# Patient Record
Sex: MALE | Race: AMERICAN INDIAN OR ALASKA NATIVE | URBAN - NONMETROPOLITAN AREA
[De-identification: names, ages, dates, MRNs, and addresses within clinical notes are randomized per-mention and may not be internally consistent; named-entity substitution may affect disease eponyms.]

---

## 2017-11-03 ENCOUNTER — HOSPITAL ENCOUNTER (EMERGENCY)
Facility: HOSPITAL | Age: 57
Discharge: HOME | End: 2017-11-03
Attending: NURSE PRACTITIONER | Admitting: NURSE PRACTITIONER
Payer: COMMERCIAL

## 2017-11-03 ENCOUNTER — APPOINTMENT (OUTPATIENT)
Dept: ULTRASOUND IMAGING | Facility: HOSPITAL | Age: 57
End: 2017-11-03
Attending: NURSE PRACTITIONER
Payer: COMMERCIAL

## 2017-11-03 VITALS
SYSTOLIC BLOOD PRESSURE: 114 MMHG | TEMPERATURE: 98.4 F | DIASTOLIC BLOOD PRESSURE: 64 MMHG | HEIGHT: 69 IN | HEART RATE: 53 BPM | BODY MASS INDEX: 26.66 KG/M2 | RESPIRATION RATE: 16 BRPM | OXYGEN SATURATION: 99 % | WEIGHT: 180 LBS

## 2017-11-03 DIAGNOSIS — M79.89 RIGHT LEG SWELLING: ICD-10-CM

## 2017-11-03 DIAGNOSIS — M79.10 MUSCLE PAIN: ICD-10-CM

## 2017-11-03 LAB
BASOPHILS # BLD AUTO: 0.1 10E9/L (ref 0–0.2)
BASOPHILS NFR BLD AUTO: 0.8 %
CRP SERPL-MCNC: <2.9 MG/L (ref 0–8)
DIFFERENTIAL METHOD BLD: ABNORMAL
EOSINOPHIL # BLD AUTO: 0.5 10E9/L (ref 0–0.7)
EOSINOPHIL NFR BLD AUTO: 4 %
ERYTHROCYTE [DISTWIDTH] IN BLOOD BY AUTOMATED COUNT: 13.7 % (ref 10–15)
HCT VFR BLD AUTO: 42.6 % (ref 40–53)
HGB BLD-MCNC: 14.3 G/DL (ref 13.3–17.7)
IMM GRANULOCYTES # BLD: 0 10E9/L (ref 0–0.4)
IMM GRANULOCYTES NFR BLD: 0.4 %
LYMPHOCYTES # BLD AUTO: 3.8 10E9/L (ref 0.8–5.3)
LYMPHOCYTES NFR BLD AUTO: 33.2 %
MCH RBC QN AUTO: 29.8 PG (ref 26.5–33)
MCHC RBC AUTO-ENTMCNC: 33.6 G/DL (ref 31.5–36.5)
MCV RBC AUTO: 89 FL (ref 78–100)
MONOCYTES # BLD AUTO: 1 10E9/L (ref 0–1.3)
MONOCYTES NFR BLD AUTO: 8.6 %
NEUTROPHILS # BLD AUTO: 6 10E9/L (ref 1.6–8.3)
NEUTROPHILS NFR BLD AUTO: 53 %
NRBC # BLD AUTO: 0 10*3/UL
NRBC BLD AUTO-RTO: 0 /100
PLATELET # BLD AUTO: 258 10E9/L (ref 150–450)
RBC # BLD AUTO: 4.8 10E12/L (ref 4.4–5.9)
WBC # BLD AUTO: 11.4 10E9/L (ref 4–11)

## 2017-11-03 PROCEDURE — 36415 COLL VENOUS BLD VENIPUNCTURE: CPT | Performed by: NURSE PRACTITIONER

## 2017-11-03 PROCEDURE — 99213 OFFICE O/P EST LOW 20 MIN: CPT

## 2017-11-03 PROCEDURE — 85025 COMPLETE CBC W/AUTO DIFF WBC: CPT | Performed by: NURSE PRACTITIONER

## 2017-11-03 PROCEDURE — 86140 C-REACTIVE PROTEIN: CPT | Performed by: NURSE PRACTITIONER

## 2017-11-03 PROCEDURE — 93971 EXTREMITY STUDY: CPT | Mod: TC,RT

## 2017-11-03 PROCEDURE — 99203 OFFICE O/P NEW LOW 30 MIN: CPT | Performed by: NURSE PRACTITIONER

## 2017-11-03 ASSESSMENT — ENCOUNTER SYMPTOMS
APPETITE CHANGE: 0
COLOR CHANGE: 1
NAUSEA: 0
WEAKNESS: 0
NUMBNESS: 0
DIARRHEA: 0
CHILLS: 0
WOUND: 0
TROUBLE SWALLOWING: 0
PSYCHIATRIC NEGATIVE: 1
COUGH: 0
ACTIVITY CHANGE: 1
BACK PAIN: 0
FEVER: 0
VOMITING: 0
DYSURIA: 0

## 2017-11-03 NOTE — ED AVS SNAPSHOT
[facility]    [address]    [place] [address]    Phone:  [phone]                                       Loc Piña   MRN: [id number]    Department:  [facility]   Date of Visit:  11/3/2017           Patient Information     Date Of Birth          1960        Your diagnoses for this visit were:     Right leg swelling Rule out DVT.    Muscle pain        You were seen by Jeana Elam NP.      Follow-up Information     Follow up with Warner Dang In 3 days.    Specialty:  Family Practice    Why:  For re-evaluation.     Contact information:    [facility]  [address]  [place] [address]  [phone]          Follow up with [facility].    Specialty:  EMERGENCY MEDICINE    Why:  As needed, If symptoms worsen    Contact information:    [address]  [place] [address] [phone]    Additional information:    From [place]: Take [address]. Turn left at [address]. Turn left at the first stoplight on [address]. At the first stop sign, take a right onto [address]. Take a left into the parking lot and continue through until you reach the North enterance of the building.       From [place]: Take [address]. Take the [address] ramp towards [place]. Turn left onto [address]. Take a slight right onto [address]. Turn left at the first stoplight on [address]. At the first stop sign, take a right onto [address]. Take a left into the parking lot and continue through until you reach the North enterance of the building.       From [place]: Take [address]. Take a right at [address]. At the first stop sign, take a right onto [address]. Take a left into the parking lot and continue through until you reach the North enterance of the building.         Discharge Instructions       Take tylenol and or ibuprofen for pain.   Apply ice to  right lower extremity. Protect skin.   Elevate right leg as much as able.   Exercise right foot.  Work note.   Follow up with PCP in 3 days.   Return to urgent care or emergency department with any increase in symptoms or concerns.     Discharge References/Attachments     MYALGIAS (ENGLISH)    LEG SWELLING IN A SINGLE LEG (ENGLISH)         Review of your medicines      Our records show that you are taking the medicines listed below. If these are incorrect, please call your family doctor or clinic.        Dose / Directions Last dose taken    ASPIRIN PO   Dose:  81 mg        Take 81 mg by mouth   Refills:  0        insulin glargine 100 UNIT/ML injection   Commonly known as:  LANTUS        Inject Subcutaneous At Bedtime   Refills:  0        LIPITOR PO   Dose:  80 mg        Take 80 mg by mouth   Refills:  0        LISINOPRIL PO   Dose:  2.5 mg        Take 2.5 mg by mouth   Refills:  0        METFORMIN HCL PO   Dose:  500 mg        Take 500 mg by mouth   Refills:  0        METOPROLOL TARTRATE PO   Dose:  25 mg        Take 25 mg by mouth   Refills:  0        NITROSTAT SL   Dose:  0.4 mg        Place 0.4 mg under the tongue   Refills:  0        NOVOLOG SC        Refills:  0        PLAVIX PO   Dose:  75 mg        Take 75 mg by mouth   Refills:  0                Procedures and tests performed during your visit     CBC with platelets differential    CRP inflammation    US Lower Extremity Venous Duplex Right      Orders Needing Specimen Collection     None      Pending Results     No orders found from 11/1/2017 to 11/4/2017.            Pending Culture Results     No orders found from 11/1/2017 to 11/4/2017.            Thank you for choosing [facility]       Thank you for choosing [facility] for your care. Our goal is always to provide you with excellent care. Hearing back from our patients is one way we can continue to improve our services. Please take a few minutes to complete the written survey that you may receive in the mail  "after you visit with us. Thank you!        [employer] Information     [employer] lets you send messages to your doctor, view your test results, renew your prescriptions, schedule appointments and more. To sign up, go to [email or web address] . Click on \"Log in\" on the left side of the screen, which will take you to the Welcome page. Then click on \"Sign up Now\" on the right side of the page.     You will be asked to enter the access code listed below, as well as some personal information. Please follow the directions to create your username and password.     Your access code is: [email or web address]  Expires: 2018  8:01 PM     Your access code will  in 90 days. If you need help or a new code, please call your [facility] clinic or [phone].        Care EveryWhere ID     This is your Care EveryWhere ID. This could be used by other organizations to access your [facility] medical records  [id number]        Equal Access to Services     [facility] CenterEDWIGE : Hadii aad ku hadasho Sotoyali, waaxda luqadaha, qaybta kaalmada adejosepyatenzin, los treviño . So [facility] [phone].    ATENCIÓN: Si habla español, tiene a liz disposición servicios gratuitos de asistencia lingüística. Llame al [phone].    We comply with applicable federal civil rights laws and [place] laws. We do not discriminate on the basis of race, color, national origin, age, disability, sex, sexual orientation, or gender identity.            After Visit Summary       This is your record. Keep this with you and show to your community pharmacist(s) and doctor(s) at your next visit.                  "

## 2017-11-03 NOTE — ED NOTES
C/o RLE swelling and pain for 2 days, was seen in [facility] today and told to come here and rule out DVT. Pt believes he may  Have pulled a muscle

## 2017-11-03 NOTE — ED PROVIDER NOTES
"  History     Chief Complaint   Patient presents with     Leg Swelling     RLE, seen at [facility], R/O DVT     The history is provided by the patient. No  was used.     Loc Piña is a 56 year old male who presents for a right lower leg ultrasound. He was evaluated by Tima Smith at [facility] and was recommended to come to this facility for an ultrasound to rule out DVT. Right lower calf/leg pain started 3 days ago after he was wresting with his grand kids. Increased pain to right leg with weight bearing. The pain is slightly better with weight bearing today, but is still painful. Denies numbness or tingling to right leg. Denies injury or trauma to right lower leg. No history of blood clots. He is currently taking Plavix and Aspirin 81 mg. He's has cardiac stent placement in the past year. He is diabetic and his blood sugar has been \"normal\" for him. He's taken ibuprofen with mild effectiveness. Denies fever, chills, or night sweats. Eating and drinking well. Bowel and bladder are working well.       Problem List:    There are no active problems to display for this patient.       Past Medical History:    History reviewed. No pertinent past medical history.    Past Surgical History:    History reviewed. No pertinent surgical history.    Family History:    No family history on file.    Social History:  Marital Status:   [2]  Social History   Substance Use Topics     Smoking status: Never Smoker     Smokeless tobacco: Never Used     Alcohol use Not on file        Medications:      ASPIRIN PO   Atorvastatin Calcium (LIPITOR PO)   Clopidogrel Bisulfate (PLAVIX PO)   Insulin Aspart (NOVOLOG SC)   insulin glargine (LANTUS) 100 UNIT/ML injection   LISINOPRIL PO   METFORMIN HCL PO   METOPROLOL TARTRATE PO   Nitroglycerin (NITROSTAT SL)         Review of Systems   Constitutional: Positive for activity change. Negative for appetite change, chills and fever.   HENT: " "Negative for trouble swallowing.    Respiratory: Negative for cough.    Gastrointestinal: Negative for diarrhea, nausea and vomiting.   Genitourinary: Negative for dysuria.   Musculoskeletal: Positive for gait problem. Negative for back pain.        Right lower leg/calf pain. Increased pain with weight bearing on right leg.    Skin: Positive for color change. Negative for wound.        Swelling and bruising to right lower leg/calf.    Neurological: Negative for weakness and numbness.        Denies numbness or tingling to right leg.    Psychiatric/Behavioral: Negative.        Physical Exam   BP: 114/64  Pulse: 53  Temp: 98.4  F (36.9  C)  Resp: 16  Height: 175.3 cm (5' 9\")  Weight: 81.6 kg (180 lb)  SpO2: 99 %      Physical Exam   Constitutional: He is oriented to person, place, and time. He appears well-developed and well-nourished. No distress.   HENT:   Head: Normocephalic.   Mouth/Throat: Oropharynx is clear and moist.   Neck: Normal range of motion. Neck supple.   Cardiovascular: Normal rate, regular rhythm, normal heart sounds and intact distal pulses.    No murmur heard.  Pulmonary/Chest: Effort normal. No respiratory distress. He has no wheezes. He has no rales.   Abdominal: Soft. He exhibits no distension.   Musculoskeletal: He exhibits edema and tenderness. He exhibits no deformity.   CMS and ROM intact to right lower extremity. Right dorsalis pedis +2. Flexion and dorsiflexion intact to right foot. Good ROM to right ankle. TTP to gastrocnemius region. Calf tenderness. Increased calf pain with Irma's sign.    Lymphadenopathy:     He has no cervical adenopathy.   Neurological: He is alert and oriented to person, place, and time.   Skin: Skin is warm and dry. No rash noted. He is not diaphoretic. No erythema.   Psychiatric: He has a normal mood and affect. His behavior is normal.   Nursing note and vitals reviewed.      ED Course     ED Course     Procedures    Results for orders placed or performed during " the hospital encounter of 11/03/17   US Lower Extremity Venous Duplex Right    Narrative    Exam:US LOWER EXTREMITY VENOUS DUPLEX RIGHT    History: 56 years Male with  right leg pain and swelling    Comparisons:    Technique: Venous duplex ultrasonography of the right lower extremity  was performed.     Findings: The common femoral vein, superficial femoral vein and  popliteal vein are fully compressible with spontaneous and augmentable  venous flow.       There is a sizable hematoma within the proximal calf measuring  approximately 11 cm in craniocaudad length.      Impression    Impression: No evidence of deep venous thrombosis within the right  lower extremity. Sizable hematoma within the proximal to mid calf.    CLAIR SCALES MD   CBC with platelets differential   Result Value Ref Range    WBC 11.4 (H) 4.0 - 11.0 10e9/L    RBC Count 4.80 4.4 - 5.9 10e12/L    Hemoglobin 14.3 13.3 - 17.7 g/dL    Hematocrit 42.6 40.0 - 53.0 %    MCV 89 78 - 100 fl    MCH 29.8 26.5 - 33.0 pg    MCHC 33.6 31.5 - 36.5 g/dL    RDW 13.7 10.0 - 15.0 %    Platelet Count 258 150 - 450 10e9/L    Diff Method Automated Method     % Neutrophils 53.0 %    % Lymphocytes 33.2 %    % Monocytes 8.6 %    % Eosinophils 4.0 %    % Basophils 0.8 %    % Immature Granulocytes 0.4 %    Nucleated RBCs 0 0 /100    Absolute Neutrophil 6.0 1.6 - 8.3 10e9/L    Absolute Lymphocytes 3.8 0.8 - 5.3 10e9/L    Absolute Monocytes 1.0 0.0 - 1.3 10e9/L    Absolute Eosinophils 0.5 0.0 - 0.7 10e9/L    Absolute Basophils 0.1 0.0 - 0.2 10e9/L    Abs Immature Granulocytes 0.0 0 - 0.4 10e9/L    Absolute Nucleated RBC 0.0    CRP inflammation   Result Value Ref Range    CRP Inflammation <2.9 0.0 - 8.0 mg/L         Assessments & Plan (with Medical Decision Making)     Negative for DVT. Ultrasound shows a sizable hematoma within the proximal mid calf. He is on aspirin and plavix.     Discussed case with Dr. Carlotta Degroot working in the emergency department as I was  concerned for compartment syndrome. She physically visualized his right leg. She recommended contacting Dr. Marei who is the on call surgeon.     Discussed case with Dr. Wallace Marie. He recommended contacting orthopedics on call for concern of compartment syndrome.    Discussed case with Dr. Tenorio who is on call at [facility] in orthopedics. He recommended for patient to continue to observe leg and follow up with any increase in symptoms or concerns. He recommended him elevating his right leg, using ice, rest leg, and stretch right foot.      Discussed plan of care. He verbalized understanding. All questions answered.     I have reviewed the nursing notes.    I have reviewed the findings, diagnosis, plan and need for follow up with the patient.  Discharged in stable condition.     New Prescriptions    No medications on file       Final diagnoses:   Right leg swelling - Rule out DVT.   Muscle pain     Take tylenol for pain.   Apply ice to right lower extremity. Protect skin.   Elevate right leg as much as able.   Exercise right foot.  Work note.   Follow up with PCP in 3 days.   Return to urgent care or emergency department with any increase in symptoms or concerns.     MARIO Lopez  11/3/2017  6:03 PM  [facility]       Jeana Elam NP  11/05/17 0906

## 2017-11-03 NOTE — ED AVS SNAPSHOT
[facility]    [address] [address]    Phone:  [phone]                                       Loc Piña   MRN: [id number]    Department[facility]  [facility]   Date of Visit:  11/3/2017           After Visit Summary Signature Page     I have received my discharge instructions, and my questions have been answered. I have discussed any challenges I see with this plan with the nurse or doctor.    ..........................................................................................................................................  Patient/Patient Representative Signature      ..........................................................................................................................................  Patient Representative Print Name and Relationship to Patient    ..................................................               ................................................  Date                                            Time    ..........................................................................................................................................  Reviewed by Signature/Title    ...................................................              ..............................................  Date                                                            Time

## 2017-11-03 NOTE — LETTER
[facility]  [address] [address]  Phone: [phone]    November 3, 2017        Loc Piña  [address] [facility] [place] [address]          To whom it may concern:    RE: Loc Piña    Patient was seen and treated today at our clinic.    Please excuse from work on 11-2-17, 11-3-17, and 11-6-17.       Sincerely,        MARIO Lopez  11/3/2017  8:02 PM  [facility]

## 2017-11-04 NOTE — DISCHARGE INSTRUCTIONS
Take tylenol for pain.   Apply ice to right lower extremity. Protect skin.   Elevate right leg as much as able.   Exercise right foot.  Work note.   Follow up with PCP in 3 days.   Return to urgent care or emergency department with any increase in symptoms or concerns.